# Patient Record
Sex: FEMALE | Race: WHITE | URBAN - METROPOLITAN AREA
[De-identification: names, ages, dates, MRNs, and addresses within clinical notes are randomized per-mention and may not be internally consistent; named-entity substitution may affect disease eponyms.]

---
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
## 2025-01-02 ENCOUNTER — INITIAL CONSULT (OUTPATIENT)
Dept: CARDIOLOGY CLINIC | Age: 20
End: 2025-01-02
Payer: COMMERCIAL

## 2025-01-02 VITALS
SYSTOLIC BLOOD PRESSURE: 104 MMHG | HEIGHT: 60 IN | WEIGHT: 162 LBS | HEART RATE: 72 BPM | DIASTOLIC BLOOD PRESSURE: 62 MMHG | BODY MASS INDEX: 31.8 KG/M2

## 2025-01-02 DIAGNOSIS — R00.2 PALPITATIONS: Primary | ICD-10-CM

## 2025-01-02 PROCEDURE — 93000 ELECTROCARDIOGRAM COMPLETE: CPT | Performed by: INTERNAL MEDICINE

## 2025-01-02 PROCEDURE — 99203 OFFICE O/P NEW LOW 30 MIN: CPT | Performed by: INTERNAL MEDICINE

## 2025-01-02 RX ORDER — DESVENLAFAXINE 25 MG/1
25 TABLET, EXTENDED RELEASE ORAL DAILY
COMMUNITY
Start: 2024-12-13

## 2025-01-02 ASSESSMENT — ENCOUNTER SYMPTOMS
BLOOD IN STOOL: 0
BACK PAIN: 0
PHOTOPHOBIA: 0
EYE PAIN: 0
COUGH: 0
VOMITING: 0
CONSTIPATION: 0
SHORTNESS OF BREATH: 1
NAUSEA: 0
COLOR CHANGE: 0
WHEEZING: 0
CHEST TIGHTNESS: 0
DIARRHEA: 0
ABDOMINAL PAIN: 0

## 2025-01-02 NOTE — PROGRESS NOTES
Electrophysiology Consult Note      Reason for consultation:  Palpitations    Chief complaint :  Palpitations, dizziness, Shortness of breath with exertion    Referring physician: Mindy Crawford CNP ( )      Primary care physician: No primary care provider on file.      History of Present Illness:     History of Present Illness  The patient is a 19-year-old female who presents for evaluation of heart palpitations.    She reports experiencing cardiac issues, predominantly characterized by palpitations. These symptoms are exacerbated during physical activities such as cardio exercises or walking, leading to tachycardia and subsequent dizziness. She has not experienced syncope in over a year but acknowledges previous episodes. Typically, these episodes are preceded by vomiting or some times after the episode also. A recent incident involved an episode of tachycardia during a workout session, which was followed by vomiting and a near-syncope experience. Her heart rate decreased following the vomiting episode. She also reports experiencing palpitations at rest, including during sleep. These symptoms have been present for an extended period but have intensified over the past 6 months. She consulted a cardiologist at [facility] in either 2022 or 2023, where she underwent a comprehensive cardiac evaluation, including an echocardiogram. The results were unremarkable, with the exception of a small patent foramen ovale (PFO), which was deemed non-concerning. She has not sought further medical attention since this consultation. She has shortness of breath with exertion with these palpitations    Patient denies alcohol, tobacco and caffeine. Patient states she exercises 3x weekly.       Past medical history:  PFO    Surgical history : No recent surgeries    Family history: No sudden cardiac death      Social history :  Patient denies alcohol, tobacco and caffeine    No Known

## 2025-01-06 ENCOUNTER — TELEPHONE (OUTPATIENT)
Dept: CARDIOLOGY CLINIC | Age: 20
End: 2025-01-06

## 2025-01-06 DIAGNOSIS — R00.2 PALPITATIONS: Primary | ICD-10-CM

## 2025-01-06 NOTE — TELEPHONE ENCOUNTER
Called patient and dates and times of tilt table given;    PPW 1/13/2025 @ 0830    Tilt Table 1/20/2025 @ 7725

## 2025-01-13 ENCOUNTER — HOSPITAL ENCOUNTER (OUTPATIENT)
Age: 20
Discharge: HOME | End: 2025-01-13
Payer: COMMERCIAL

## 2025-01-13 ENCOUNTER — NURSE ONLY (OUTPATIENT)
Age: 20
End: 2025-01-13

## 2025-01-13 LAB
ANION GAP SERPL CALCULATED.3IONS-SCNC: 9 MMOL/L (ref 9–17)
BUN SERPL-MCNC: 12 MG/DL (ref 7–20)
CALCIUM SERPL-MCNC: 9.2 MG/DL (ref 8.3–10.6)
CHLORIDE SERPL-SCNC: 107 MMOL/L (ref 99–110)
CO2 SERPL-SCNC: 25 MMOL/L (ref 21–32)
CREAT SERPL-MCNC: 0.8 MG/DL (ref 0.6–1.1)
GFR, ESTIMATED: >90 ML/MIN/1.73M2
GLUCOSE SERPL-MCNC: 84 MG/DL (ref 74–99)
MAGNESIUM SERPL-MCNC: 2 MG/DL (ref 1.8–2.4)
POTASSIUM SERPL-SCNC: 4.2 MMOL/L (ref 3.5–5.1)
SODIUM SERPL-SCNC: 141 MMOL/L (ref 136–145)

## 2025-01-13 PROCEDURE — 80048 BASIC METABOLIC PNL TOTAL CA: CPT

## 2025-01-13 PROCEDURE — 83735 ASSAY OF MAGNESIUM: CPT

## 2025-01-13 NOTE — PROGRESS NOTES
Patient here in office and educated on 01/13/25, scheduled for Tilt Table Procedure on 1/20/25 @ 1430, with arrival @ 1330, @ [facility]; consents signed. Copy of orders given for labs due by 1/15/25 at [facility]. Instructions given to patient to :NPO after midnight including water the night before procedure. May take rest of morning medications day of procedure with a sip of water. You will need to arrive at the hospital 1 hour prior to the procedure. When you arrive at the hospital please go to registration desk. You will need to arrange for someone to drive you home after the procedure. Please wear comfortable clothing for the procedure. Patient voiced understanding. Copies of consent & info scanned in chart.

## 2025-01-20 ENCOUNTER — HOSPITAL ENCOUNTER (OUTPATIENT)
Dept: NON INVASIVE DIAGNOSTICS | Age: 20
Discharge: HOME | End: 2025-01-22
Attending: INTERNAL MEDICINE
Payer: COMMERCIAL

## 2025-01-20 ENCOUNTER — TELEPHONE (OUTPATIENT)
Dept: CARDIOLOGY CLINIC | Age: 20
End: 2025-01-20

## 2025-01-20 VITALS
OXYGEN SATURATION: 100 % | HEART RATE: 79 BPM | RESPIRATION RATE: 12 BRPM | SYSTOLIC BLOOD PRESSURE: 110 MMHG | DIASTOLIC BLOOD PRESSURE: 60 MMHG

## 2025-01-20 DIAGNOSIS — R00.2 PALPITATIONS: ICD-10-CM

## 2025-01-20 PROCEDURE — 93660 TILT TABLE EVALUATION: CPT

## 2025-01-20 PROCEDURE — 99222 1ST HOSP IP/OBS MODERATE 55: CPT | Performed by: NURSE PRACTITIONER

## 2025-01-20 RX ORDER — MIDODRINE HYDROCHLORIDE 2.5 MG/1
2.5 TABLET ORAL 3 TIMES DAILY
Qty: 180 TABLET | Refills: 1 | Status: SHIPPED | OUTPATIENT
Start: 2025-01-20

## 2025-01-20 NOTE — PROGRESS NOTES
Electrophysiology History and Physical Note        Reason for consultation: Palpitations     Chief complaint : Palpitations, dizziness, Shortness of breath with exertion     Referring physician: Mindy Crawford CNP ( )        Primary care physician: No primary care provider on file.        History of Present Illness:      This visit 1/20/2025  Kaitlynn Hernandez is a 19 year old female with no significant previous health history.   She presents for tilt table test. She was recently seen in the office for complaints of palpitations, dizziness, and shortness of breath with exertion.   No change to previous office assessment.     The patient is a 19-year-old female who presents for evaluation of heart palpitations.     She reports experiencing cardiac issues, predominantly characterized by palpitations. These symptoms are exacerbated during physical activities such as cardio exercises or walking, leading to tachycardia and subsequent dizziness. She has not experienced syncope in over a year but acknowledges previous episodes. Typically, these episodes are preceded by vomiting or some times after the episode also. A recent incident involved an episode of tachycardia during a workout session, which was followed by vomiting and a near-syncope experience. Her heart rate decreased following the vomiting episode. She also reports experiencing palpitations at rest, including during sleep. These symptoms have been present for an extended period but have intensified over the past 6 months. She consulted a cardiologist at [facility] in either 2022 or 2023, where she underwent a comprehensive cardiac evaluation, including an echocardiogram. The results were unremarkable, with the exception of a small patent foramen ovale (PFO), which was deemed non-concerning. She has not sought further medical attention since this consultation. She has shortness of breath with exertion with these palpitations     Patient denies alcohol,

## 2025-01-20 NOTE — PROGRESS NOTES
[facility]     Electrophysiology Procedure Note       Date of Procedure: 1/20/2025  Patient's Name: Kaitlynn Hernandez  YOB: 2005   Medical Record Number: [id number]    Procedure Performed by: Laurent Madden MD     Procedures performed:    Tilt table testing    Indication of the procedure:  Kaitlynn Hernandez is a 19 y.o. female presented with syncope.     Details of procedure:    Procedure's risks, benefits and alternatives of procedure were explained to patient. All questions were answered. Patient understood and informed consent was obtained.The patient was brought to the electrophysiology lab in a fasting nonsedated state. Peripheral IV access was obtained and he was put on the tilt table test.    Initial vital signs at baseline:  BP: 120/64 .  HR: 79   RR:  16  O2sat: 97%    Then the tilt table was raised to 70 degree.     Immediately upon raising the table the vitals were:   BP: 120/69  HR: 88     1419- complains of slight dizziness  /78  HR 90  02sat: 99%    1420- continues to be dizzy  /69  HR 94  Sp02: 99%    1424- no symptoms  /85  HR 95  Sp02 98%    1425- lightheaded  /64  HR 90  Sp02 98%    1427- lost consciousness  Pulse strong  BP 72/40  HR 46, then quickly dropped to 26  Spo2 98%     The Tilt table test was immediately brought back to [place] position and she received IV fluid bolus.       At the end of procedure:  BP: 96/57   HR: 70   O2sat: 97    The patient tolerated the procedure well and there were no complications.    Conclusion:    positive tilt table test.     Diagnosis: vasoinhibitory syncope        Plan:   Start midodrine 2.5 mg TID  Follow up in the office in 102 weeks    1. Hydration - more electrolyte water     2. Add pinch of salt to the food - mild increase in salt     3. Physical counterpressure maneuvers are intended to reduce lower-extremity venous pooling and therefore improve cardiac output and prevent

## 2025-01-22 PROBLEM — Z00.8 TILT TABLE EVALUATION: Status: ACTIVE | Noted: 2025-01-22

## 2025-02-12 ENCOUNTER — OFFICE VISIT (OUTPATIENT)
Age: 20
End: 2025-02-12
Payer: COMMERCIAL

## 2025-02-12 VITALS
HEART RATE: 64 BPM | HEIGHT: 60 IN | DIASTOLIC BLOOD PRESSURE: 76 MMHG | BODY MASS INDEX: 32.04 KG/M2 | SYSTOLIC BLOOD PRESSURE: 118 MMHG | WEIGHT: 163.2 LBS

## 2025-02-12 DIAGNOSIS — R55 VASOVAGAL SYNCOPE: Primary | ICD-10-CM

## 2025-02-12 PROCEDURE — 99213 OFFICE O/P EST LOW 20 MIN: CPT | Performed by: NURSE PRACTITIONER

## 2025-02-12 ASSESSMENT — ENCOUNTER SYMPTOMS
SINUS PRESSURE: 0
BACK PAIN: 0
ABDOMINAL DISTENTION: 0
SHORTNESS OF BREATH: 0
CHEST TIGHTNESS: 0
BLOOD IN STOOL: 0
PHOTOPHOBIA: 0
VOMITING: 0
COLOR CHANGE: 0
NAUSEA: 0
DIARRHEA: 0
CONSTIPATION: 0
WHEEZING: 0
SINUS PAIN: 0
COUGH: 0
ABDOMINAL PAIN: 0

## 2025-02-12 NOTE — PROGRESS NOTES
confusion.            Examination:      Vitals:    02/12/25 1645 02/12/25 1649 02/12/25 1650   BP: 120/80 120/82 118/76   Site: Left Upper Arm Left Upper Arm Left Upper Arm   Position: Sitting Sitting Standing   Cuff Size: Medium Adult Medium Adult Medium Adult   Pulse: 64     Weight: 74 kg (163 lb 3.2 oz)     Height: 1.524 m (5')        Body mass index is 31.87 kg/m².      Physical Exam  Constitutional:       Appearance: Normal appearance.   HENT:      Head: Normocephalic and atraumatic.      Right Ear: External ear normal.      Left Ear: External ear normal.      Nose: Nose normal.      Mouth/Throat:      Mouth: Mucous membranes are moist.   Eyes:      General:         Right eye: No discharge.         Left eye: No discharge.   Cardiovascular:      Rate and Rhythm: Normal rate and regular rhythm.      Heart sounds: No murmur heard.  Pulmonary:      Effort: Pulmonary effort is normal.      Breath sounds: No wheezing, rhonchi or rales.   Abdominal:      General: Abdomen is flat.      Palpations: Abdomen is soft.   Musculoskeletal:         General: Normal range of motion.      Cervical back: Normal range of motion.      Right lower leg: No edema.      Left lower leg: No edema.   Skin:     General: Skin is warm and dry.   Neurological:      General: No focal deficit present.      Mental Status: She is alert and oriented to person, place, and time.               CBC: No results found for: \"WBC\", \"HGB\", \"HCT\", \"PLT\"  Lipids:No results found for: \"CHOL\", \"TRIG\", \"HDL\", \"LDLDIRECT\"  PT/INR: No results found for: \"INR\"     BMP:    Lab Results   Component Value Date     01/13/2025    K 4.2 01/13/2025     01/13/2025    CO2 25 01/13/2025    BUN 12 01/13/2025     CMP: No results found for: \"AST\", \"ALT\", \"BILITOT\", \"ALKPHOS\"  TSH:  No results found for: \"TSH\", \"T4\"    EKGINTERPRETATION - EKG Interpretation:  SINUS RHYTHM    ECHO    SVn:      There is normal systemic venous return to the right atrium.   PVn:

## 2025-02-12 NOTE — PATIENT INSTRUCTIONS
Thank you for allowing us to care for you today!   We want to ensure we can follow your treatment plan and we strive to give you the best outcomes and experience possible.   If you ever have a life threatening emergency and call 911 - for an ambulance (EMS)  REMEMBER  Our providers can only care for you at:   [facility] or [facility]   Even if you have someone take you or you drive yourself we can only care for you in a [facility] facility. Our providers are not setup at the other healthcare locations!    PLEASE CALL OUR OFFICE DURING NORMAL BUSINESS HOURS  Monday through Friday 8 am to 5 pm  AFTER HOURS the physician on-call cannot help with scheduling, rescheduling, procedure instruction questions or any type of medication need or issue.  [facility] P:[phone] - [facility] P:[phone] - [facility] P:[phone]      If you receive a survey:  We would appreciate you taking the time to share your experience concerning your provider visit in the office.    These surveys are confidential!  We are eager to improve and are counting on you to share your feedback so we can ensure you get the best care possible.

## 2025-02-21 PROBLEM — Z00.8 TILT TABLE EVALUATION: Status: RESOLVED | Noted: 2025-01-22 | Resolved: 2025-02-21

## 2025-04-28 RX ORDER — MIDODRINE HYDROCHLORIDE 2.5 MG/1
2.5 TABLET ORAL 3 TIMES DAILY
Qty: 180 TABLET | Refills: 0 | Status: SHIPPED | OUTPATIENT
Start: 2025-04-28

## 2025-05-14 RX ORDER — MIDODRINE HYDROCHLORIDE 2.5 MG/1
2.5 TABLET ORAL 3 TIMES DAILY
Qty: 180 TABLET | Refills: 1 | OUTPATIENT
Start: 2025-05-14

## 2025-05-20 ENCOUNTER — OFFICE VISIT (OUTPATIENT)
Age: 20
End: 2025-05-20
Payer: COMMERCIAL

## 2025-05-20 VITALS
DIASTOLIC BLOOD PRESSURE: 62 MMHG | HEART RATE: 80 BPM | SYSTOLIC BLOOD PRESSURE: 118 MMHG | HEIGHT: 60 IN | WEIGHT: 148.8 LBS | BODY MASS INDEX: 29.21 KG/M2

## 2025-05-20 DIAGNOSIS — R55 VASOVAGAL SYNCOPE: Primary | ICD-10-CM

## 2025-05-20 PROCEDURE — 93000 ELECTROCARDIOGRAM COMPLETE: CPT | Performed by: NURSE PRACTITIONER

## 2025-05-20 PROCEDURE — 99213 OFFICE O/P EST LOW 20 MIN: CPT | Performed by: NURSE PRACTITIONER

## 2025-05-20 RX ORDER — MIDODRINE HYDROCHLORIDE 2.5 MG/1
2.5 TABLET ORAL 3 TIMES DAILY
Qty: 180 TABLET | Refills: 1 | Status: SHIPPED | OUTPATIENT
Start: 2025-05-20

## 2025-05-20 ASSESSMENT — ENCOUNTER SYMPTOMS
PHOTOPHOBIA: 0
COLOR CHANGE: 0
SINUS PRESSURE: 0
SHORTNESS OF BREATH: 0
ABDOMINAL DISTENTION: 0
DIARRHEA: 0
SINUS PAIN: 0
CONSTIPATION: 0
ABDOMINAL PAIN: 0
NAUSEA: 0
COUGH: 0
BACK PAIN: 0
VOMITING: 0
BLOOD IN STOOL: 0

## 2025-05-20 NOTE — PROGRESS NOTES
Electrophysiology Follow up Note      Reason for consultation:  Palpitations    Chief complaint : follow up on syncope    Referring physician: Mindy Crawford CNP ( )      Primary care physician: No primary care provider on file.      History of Present Illness:     This visit 5/20/2025  Patient here to date for follow-up on tilt table and syncope.  Patient states that since last office visit she has been feeling well.  She states that she has not had any syncopal episodes.  She denies dizziness or lightheadedness.  Patient reports that she has missed a few doses and feels that she only needs to take midodrine 2 times a day.    Previous visit 2/12/2025  Patient is here to today for follow-up on tilt table test.  Patient states since the tilt table test she has been feeling well.  She is taking midodrine.  She denies chest pain, palpitation, shortness of breath, lightheadedness, dizziness, edema or syncope.  Patient states that she has not passed out  She has not wearing compression stockings but she is drinking electrolyte hummel.    Previous visit  History of Present Illness  The patient is a 19-year-old female who presents for evaluation of heart palpitations.    She reports experiencing cardiac issues, predominantly characterized by palpitations. These symptoms are exacerbated during physical activities such as cardio exercises or walking, leading to tachycardia and subsequent dizziness. She has not experienced syncope in over a year but acknowledges previous episodes. Typically, these episodes are preceded by vomiting or some times after the episode also. A recent incident involved an episode of tachycardia during a workout session, which was followed by vomiting and a near-syncope experience. Her heart rate decreased following the vomiting episode. She also reports experiencing palpitations at rest, including during sleep. These symptoms have been present for an

## 2025-07-30 ENCOUNTER — HOSPITAL ENCOUNTER (OUTPATIENT)
Age: 20
Setting detail: SPECIMEN
Discharge: HOME | End: 2025-07-30
Payer: COMMERCIAL

## 2025-07-30 PROCEDURE — 87076 CULTURE ANAEROBE IDENT EACH: CPT

## 2025-07-30 PROCEDURE — 87070 CULTURE OTHR SPECIMN AEROBIC: CPT

## 2025-07-30 PROCEDURE — 87075 CULTR BACTERIA EXCEPT BLOOD: CPT

## 2025-07-30 PROCEDURE — 87077 CULTURE AEROBIC IDENTIFY: CPT

## 2025-07-30 PROCEDURE — 87205 SMEAR GRAM STAIN: CPT

## 2025-08-02 LAB
MICROORGANISM SPEC CULT: ABNORMAL
MICROORGANISM SPEC CULT: ABNORMAL
MICROORGANISM/AGENT SPEC: ABNORMAL
SPECIMEN DESCRIPTION: ABNORMAL

## 2025-08-06 PROBLEM — M79.18 LEFT BUTTOCK PAIN: Status: ACTIVE | Noted: 2025-08-06

## 2025-08-06 PROBLEM — L05.01 PILONIDAL CYST WITH ABSCESS: Status: ACTIVE | Noted: 2025-08-06

## 2025-09-03 RX ORDER — NORGESTIMATE AND ETHINYL ESTRADIOL 0.25-0.035
1 KIT ORAL DAILY
COMMUNITY

## 2025-09-03 ASSESSMENT — LIFESTYLE VARIABLES
HOW MANY STANDARD DRINKS CONTAINING ALCOHOL DO YOU HAVE ON A TYPICAL DAY: PATIENT DOES NOT DRINK
HOW OFTEN DO YOU HAVE A DRINK CONTAINING ALCOHOL: NEVER